# Patient Record
(demographics unavailable — no encounter records)

---

## 2024-12-10 NOTE — HISTORY OF PRESENT ILLNESS
[FreeTextEntry1] : Patient is a 70 yo female, never a smoker, with a PMHx of HTN, depression, multiple orthopedic problems. She has a hx of sleeve gastrectomy and hiatal hernia repair 6/8/21, lost approximately 70 lbs, started Ozempic for c/o recent weight gain.   Patient is referred by DR. LILY MORSE for surgical eval of a lung nodule noted on CT scan.  Patient was planning for a shoulder replacement surgery, preop shoulder CT showed the left upper lobe nodule.  Per patient, she was told to have pulmonary nodule in 2021, when she had CT Chest for dyspnea, lost follow up.  She is very anxious about the current finding. Denies unintentional weight loss, headache, anorexia, fatigue, persist cough, chest pain, dyspnea, or hemoptysis. She saw CTS in [facility] who was not aware about her previous scan, and suggested a 3-month follow up with CT chest,   CT shoulder w/o IVC 11/25/24: -Severe right glenohumeral osteoarthritis with mild attritional glenoid bone loss and acquired glenoid retroversion -Irregular mixed cystic and solid left upper pole pulmonary lesion. While this may be infectious in etiology, short interval follow up chest ct ( 8-10 weeks) and thoracic surgery  -Incidental outpatient: indeterminate mixed cystic and solid left upper lobe pulmonary lesion with recommendations of a follow up chest ct in 8-10 weeks and thoracic surgery consultation.

## 2024-12-10 NOTE — HISTORY OF PRESENT ILLNESS
[FreeTextEntry1] : Patient is a 70 yo female, never a smoker, with a PMHx of HTN, depression, multiple orthopedic problems. She has a hx of sleeve gastrectomy and hiatal hernia repair 6/8/21, lost approximately 70 lbs, started Ozempic for c/o recent weight gain.   Patient is referred by DR. LILY MORSE for surgical eval of a lung nodule noted on CT scan.  Patient was planning for a shoulder replacement surgery, preop shoulder CT showed the left upper lobe nodule.  Per patient, she was told to have pulmonary nodule in 2021, when she had CT Chest for dyspnea, lost follow up.  She is very anxious about the current finding. Denies unintentional weight loss, headache, anorexia, fatigue, persist cough, chest pain, dyspnea, or hemoptysis. She saw CTS in [facility] who was not aware about her previous scan, and suggested a 3-month follow up with CT chest,   CT shoulder w/o IVC 11/25/24: -Severe right glenohumeral osteoarthritis with mild attritional glenoid bone loss and acquired glenoid retroversion -Irregular mixed cystic and solid left upper pole pulmonary lesion. While this may be infectious in etiology, short interval follow up chest ct ( 8-10 weeks) and thoracic surgery  -Incidental outpatient: indeterminate mixed cystic and solid left upper lobe pulmonary lesion with recommendations of a follow up chest ct in 8-10 weeks and thoracic surgery consultation.

## 2024-12-10 NOTE — CONSULT LETTER
[Dear  ___] : Dear  [unfilled], [Consult Letter:] : I had the pleasure of evaluating your patient, [unfilled]. [Please see my note below.] : Please see my note below. [Sincerely,] : Sincerely, [FreeTextEntry2] : DR. LILY MORSE [FreeTextEntry3] : Sathya Macario MD   Attending Thoracic Surgeon  Department of Cardiovascular and Thoracic Surgery   of Cardiothoracic Surgery  [employer] at [facility], Division of Thoracic Surgery  [address] [address]  Tel: [phone]  Fax: [phone]

## 2024-12-10 NOTE — HISTORY OF PRESENT ILLNESS
[FreeTextEntry1] : Patient is a 70 yo female, never a smoker, with a PMHx of HTN, depression, multiple orthopedic problems. She has a hx of sleeve gastrectomy and hiatal hernia repair 6/8/21, lost approximately 70 lbs, started Ozempic for c/o recent weight gain.   Patient is referred by DR. LILY MORSE for surgical eval of a lung nodule noted on CT scan.  Patient was planning for a shoulder replacement surgery, preop shoulder CT showed the left upper lobe nodule.  Per patient, she was told to have pulmonary nodule in 2021, when she had CT Chest for dyspnea, lost follow up.  She is very anxious about the current finding. Denies unintentional weight loss, headache, anorexia, fatigue, persist cough, chest pain, dyspnea, or hemoptysis. She saw CTS in [facility] who was not aware about her previous scan, and suggested a 3-month follow up with CT chest,   CT shoulder w/o IVC 11/25/24: -Severe right glenohumeral osteoarthritis with mild attritional glenoid bone loss and acquired glenoid retroversion -Irregular mixed cystic and solid left upper pole pulmonary lesion. While this may be infectious in etiology, short interval follow up chest ct ( 8-10 weeks) and thoracic surgery  -Incidental outpatient: indeterminate mixed cystic and solid left upper lobe pulmonary lesion with recommendations of a follow up chest ct in 8-10 weeks and thoracic surgery consultation.

## 2024-12-10 NOTE — PHYSICAL EXAM
[Restricted in physically strenuous activity but ambulatory and able to carry out work of a light or sedentary nature] : Status 1- Restricted in physically strenuous activity but ambulatory and able to carry out work of a light or sedentary nature, e.g., light house work, office work [General Appearance - Alert] : alert [General Appearance - In No Acute Distress] : in no acute distress [Neck Appearance] : the appearance of the neck was normal [Neck Cervical Mass (___cm)] : no neck mass was observed [Jugular Venous Distention Increased] : there was no jugular-venous distention [Thyroid Diffuse Enlargement] : the thyroid was not enlarged [Thyroid Nodule] : there were no palpable thyroid nodules [] : no respiratory distress [Auscultation Breath Sounds / Voice Sounds] : lungs were clear to auscultation bilaterally [Heart Rate And Rhythm] : heart rate was normal and rhythm regular [Heart Sounds] : normal S1 and S2 [Heart Sounds Gallop] : no gallops [Murmurs] : no murmurs [Heart Sounds Pericardial Friction Rub] : no pericardial rub [Deep Tendon Reflexes (DTR)] : deep tendon reflexes were 2+ and symmetric [Sensation] : the sensory exam was normal to light touch and pinprick [No Focal Deficits] : no focal deficits [Oriented To Time, Place, And Person] : oriented to person, place, and time [Impaired Insight] : insight and judgment were intact [Affect] : the affect was normal

## 2024-12-10 NOTE — DATA REVIEWED
[FreeTextEntry1] : CT chest on 04/20/2021 - Lungs and large airways: Tubular nodule in the apical posterior segment left upper lobe, likely mucous plugging. Subcentimeter nodules along the left fissure, likely intrapulmonary lymph nodes. Mild cylindrical bronchiectasis. Patent central airways. - Pleura:  No pleural effusion. - Mediastinum and hilar regions: No thoracic lymphadenopathy. - Heart and pericardium:  Heart size is normal. No pericardial effusion. - Vessels:  Mild aortic root calcification. Borderline dilated ascending aorta, 3.9 cm. - Chest wall and lower neck:  Normal. - Upper abdomen: Hepatic steatosis. Surgical clips along the second portion of the duodenum. Right posterior diaphragmatic hernia containing fat. - Bones: Posterior fixation hardware in the lower thoracic spine and lumbar spine. Degenerative changes in the spine.

## 2024-12-10 NOTE — ASSESSMENT
[FreeTextEntry1] : Patient is a 71 year old never smoker, , who was referred by Dr. Madison Sanchez, for an enlarging left upper lobe cystic lung nodule.   Patient has a family history of lung cancer, is a never smoker and denies significant environmental exposures. She had a chest CT in April 2021 that showed a small cystic nodule in, less than 5 mm in size, that was concerning for mucus plugging. Patient obtained a left shoulder MRI on November 25, 2024 that showed this nodule is now an irregular cystic nodule measuring 2.5 cm in greatest dimension.   I discussed the differential diagnosis with her and despite her smoking status, I am still very concerned that this is a primary lung cancer, given that was present in 2021 and enlarged over time.  Discussed diagnosis and staging of lung cancer. Diagnosis can be obtained by CT guided biopsy or surgical biopsy. As it is cystic, a CT guided biopsy may be challenging to obtain a diagnosis. Discussed obtaining a PET/CT under the presumption that it is a lung cancer and PFTs. Will review the results with her to discuss surgical resection for diagnosis and treatment. Patient understood and agreed. Will follow up when results are available.   PLAN 1.  PET/CT 2.  PFTs 3.  Return to clinic  I, Dr. Macario, personally performed the evaluation and management (E/M) services for this new patient. That E/M includes conducting the clinically appropriate initial history &/or exam, assessing all conditions, and establishing the plan of care. Today, my PHILLIP, [Ivana Vences], was here to observe my evaluation and management service for this patient & follow plan of care established by me going forward.

## 2024-12-10 NOTE — CONSULT LETTER
[Dear  ___] : Dear  [unfilled], [Consult Letter:] : I had the pleasure of evaluating your patient, [unfilled]. [Please see my note below.] : Please see my note below. [Sincerely,] : Sincerely, [FreeTextEntry2] : DR. LILY MORSE [FreeTextEntry3] : Sathya Macario MD   Attending Thoracic Surgeon  Department of Cardiovascular and Thoracic Surgery   of Cardiothoracic Surgery  [employer] at [facility], Division of Thoracic Surgery  [address] [address]  Tel: [phone]  Fax: [phone]

## 2024-12-10 NOTE — CONSULT LETTER
[Dear  ___] : Dear  [unfilled], [Consult Letter:] : I had the pleasure of evaluating your patient, [unfilled]. [Please see my note below.] : Please see my note below. [Sincerely,] : Sincerely, [FreeTextEntry2] : DR. LILY MORSE [FreeTextEntry3] : Sathya Macario MD   Attending Thoracic Surgeon  Department of Cardiovascular and Thoracic Surgery   of Cardiothoracic Surgery  [employer] at [facility], Division of Thoracic Surgery  [address] [id number]  Tel: [phone]  Fax: [phone]

## 2024-12-11 NOTE — REASON FOR VISIT
[Home] : at home, [unfilled] , at the time of the visit. [Medical Office: ([facility])___] : at the medical office located in  [Patient] : the patient [Follow-Up: _____] : a [unfilled] follow-up visit

## 2024-12-11 NOTE — REASON FOR VISIT
[Home] : at home, [unfilled] , at the time of the visit. [Medical Office: ([facility])___] : at the medical office located in  [Patient] : the patient [Follow-Up: _____] : a [unfilled] follow-up visit

## 2024-12-11 NOTE — REASON FOR VISIT
[Home] : at home, [unfilled] , at the time of the visit. [Medical Office: ([facility])___] : at the medical office located in  [Patient] : the patient [Follow-Up: _____] : a [unfilled] follow-up visit

## 2024-12-11 NOTE — REASON FOR VISIT
[Home] : at home, [unfilled] , at the time of the visit. [Medical Office: ([facility])___] : at the medical office located in  [Patient] : the patient [Follow-Up: _____] : a [unfilled] follow-up visit

## 2024-12-15 NOTE — HISTORY OF PRESENT ILLNESS
[FreeTextEntry1] : Patient is a 70 yo female, never a smoker, with a PMHx of HTN, depression, multiple orthopedic problems. She has a hx of sleeve gastrectomy and hiatal hernia repair 6/8/21. Patient was referred by DR. LILY MORSE for an enlarging left upper lobe cystic lung nodule.  Patient has a family history of lung cancer, is a never smoker and denies significant environmental exposures. She had a chest CT in April 2021 that showed a small cystic nodule in, less than 5 mm in size, that was concerning for mucus plugging. Patient obtained a left shoulder MRI on November 25, 2024 that showed this nodule is now an irregular cystic nodule measuring 2.5 cm in greatest dimension.  A primary lung cancer was the major differential, given that was present in 2021 and enlarged over time. Discussed diagnosis and staging of lung cancer. Diagnosis can be obtained by CT guided biopsy or surgical biopsy. As it is cystic, a CT guided biopsy may be challenging to obtain a diagnosis  She presents today after completion of PET/CT and PFT.   PET on 12/06/2024  1. Left apical lung lesion with mild FDG uptake, increased in overall size since 2021, is strongly favored to be of benign etiology, possibly representing area of focal scarring with associated traction bronchiectasis. A cavitary malignant nodule is considered unlikely. Suggest continued periodic CT follow-up to assess for interval change as clinically warranted.  2. Small umbilical hernia with inflammatory change and associated mild uptake. Correlate with physical exam.  3. 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

## 2024-12-15 NOTE — HISTORY OF PRESENT ILLNESS
[FreeTextEntry1] : Patient is a 72 yo female, never a smoker, with a PMHx of HTN, depression, multiple orthopedic problems. She has a hx of sleeve gastrectomy and hiatal hernia repair 6/8/21. Patient was referred by DR. LILY MORSE for an enlarging left upper lobe cystic lung nodule.  Patient has a family history of lung cancer, is a never smoker and denies significant environmental exposures. She had a chest CT in April 2021 that showed a small cystic nodule in, less than 5 mm in size, that was concerning for mucus plugging. Patient obtained a left shoulder MRI on November 25, 2024 that showed this nodule is now an irregular cystic nodule measuring 2.5 cm in greatest dimension.  A primary lung cancer was the major differential, given that was present in 2021 and enlarged over time. Discussed diagnosis and staging of lung cancer. Diagnosis can be obtained by CT guided biopsy or surgical biopsy. As it is cystic, a CT guided biopsy may be challenging to obtain a diagnosis  She presents today after completion of PET/CT and PFT.   PET on 12/06/2024  1. Left apical lung lesion with mild FDG uptake, increased in overall size since 2021, is strongly favored to be of benign etiology, possibly representing area of focal scarring with associated traction bronchiectasis. A cavitary malignant nodule is considered unlikely. Suggest continued periodic CT follow-up to assess for interval change as clinically warranted.  2. Small umbilical hernia with inflammatory change and associated mild uptake. Correlate with physical exam.  3. 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

## 2024-12-15 NOTE — ASSESSMENT
[FreeTextEntry1] : Patient is a 71 year old female, never smoker, who was found to have an enlarging cystic nodule in her left upper lobe. She was referred by Dr. Sanchez. Her images from 2021 and 2024 were reviewed. She underwent PET/CT and returns today to discuss management options.  All options were discussed and the patient would like to proceed with surgery. I discussed the risks benefits and alternatives of proceeding with surgery. She understood and agreed. We will proceed with PFTs on 12/31, if not sooner. She will follow up in clinic on 1/2/25 to discuss any further details of surgery and we will go to the operating room the following week. We will proceed with a left VATS robotic assisted wedge resection, possible segmentectomy and possible lymph node dissection on the week of 1/6/25.  PLAN 1.  PFTs 12/31 2.  Preoperative evaluation with Dr. Sanchez 3.  Clinic follow up on 1/2/25 4.  Surgery week of 1/6/25    I, Dr. Macario, personally performed the evaluation and management (E/M) services for this established patient who presents today with (a) new problem(s)/exacerbation of (an) existing condition(s). That E/M includes conducting the clinically appropriate interval history &/or exam, assessing all new/exacerbated conditions, and establishing a new plan of care.
(4) no limitation

## 2024-12-15 NOTE — DATA REVIEWED
[FreeTextEntry1] : CT shoulder w/o IVC 11/25/24: -Severe right glenohumeral osteoarthritis with mild attritional glenoid bone loss and acquired glenoid retroversion -Irregular mixed cystic and solid left upper pole pulmonary lesion. While this may be infectious in etiology, short interval follow up chest ct ( 8-10 weeks) and thoracic surgery -Incidental outpatient: indeterminate mixed cystic and solid left upper lobe pulmonary lesion with recommendations of a follow up chest ct in 8-10 weeks and thoracic surgery consultation.  CT chest on 04/20/2021 - Lungs and large airways: Tubular nodule in the apical posterior segment left upper lobe, likely mucous plugging. Subcentimeter nodules along the left fissure, likely intrapulmonary lymph nodes. Mild cylindrical bronchiectasis. Patent central airways. - Pleura: No pleural effusion. - Mediastinum and hilar regions: No thoracic lymphadenopathy. - Heart and pericardium: Heart size is normal. No pericardial effusion. - Vessels: Mild aortic root calcification. Borderline dilated ascending aorta, 3.9 cm. - Chest wall and lower neck: Normal. - Upper abdomen: Hepatic steatosis. Surgical clips along the second portion of the duodenum. Right posterior diaphragmatic hernia containing fat. - Bones: Posterior fixation hardware in the lower thoracic spine and lumbar spine. Degenerative changes in the spine.

## 2024-12-23 NOTE — PHYSICAL EXAM
[Obese] : obese [Normal] : affect appropriate [Well Developed] : well developed [Well Nourished] : well nourished [No Acute Distress] : no acute distress [Normal Conjunctiva] : normal conjunctiva [Normal Venous Pressure] : normal venous pressure [No Carotid Bruit] : no carotid bruit [Normal S1, S2] : normal S1, S2 [No Murmur] : no murmur [No Rub] : no rub [No Gallop] : no gallop [Clear Lung Fields] : clear lung fields [Good Air Entry] : good air entry [No Respiratory Distress] : no respiratory distress  [Soft] : abdomen soft [Non Tender] : non-tender [No Masses/organomegaly] : no masses/organomegaly [Normal Bowel Sounds] : normal bowel sounds [Normal Gait] : normal gait [No Edema] : no edema [No Cyanosis] : no cyanosis [No Clubbing] : no clubbing [No Varicosities] : no varicosities [No Rash] : no rash [No Skin Lesions] : no skin lesions [Moves all extremities] : moves all extremities [No Focal Deficits] : no focal deficits [Normal Speech] : normal speech [Alert and Oriented] : alert and oriented [Normal memory] : normal memory

## 2024-12-23 NOTE — REASON FOR VISIT
[Hyperlipidemia] : hyperlipidemia [Hypertension] : hypertension [Follow-Up Visit] : a follow-up visit for [Morbid Obesity (BMI<40)] : morbid obesity (bmi<40) [S/P Bariatric Surgery] : s/p bariatric surgery

## 2024-12-24 NOTE — END OF VISIT
[Time Spent: ___ minutes] : I have spent [unfilled] minutes of time on the encounter which excludes teaching and separately reported services. [FreeTextEntry3] : Documented by Anil Hunter acting as a scribe for STACEY Pineda on 12/06/2023

## 2024-12-24 NOTE — HISTORY OF PRESENT ILLNESS
[de-identified] : Ms. Suzanne Obregon returns today s/p sleeve gastrectomy and hiatal hernia repair 6/8/21 for follow up. Today, she returns complaining of weight regain and is interested in medical weight loss. She is scheduled for stress test tomorrow. Importance of maintaining a healthy lifestyle with a healthy diet and exercise emphasized. Has gained 12 lbs. since the last visit. Discussed weight loss with GLP analog in great detail. Will have her start on Ozempic and have her follow up prn. [FreeTextEntry1] :  Patient is a 71-year-old female never smoker who was found to have a 1.7 to 2 cm lesion when getting a x-ray for her shoulder on examination of CT scan from April 2021 there was a 4 to 6 mm lesion a PET scan was performed that showed mild enhancement after discussion with Dr. Macario patient will go for an open biopsy and if malignancy is found we will have a wedge resection and lymph node node dissection.  Patient underwent a gastric sleeve and hiatal hernia repair Denisha 3, 2021  Pt lost 65 lbs Patient has had multiple orthopedic surgeries including LS-spine surgery patient plans to have a reverse replacement of the left shoulder and during this workup a lung nodule was found Patient has a long history of hypertension Patient is cleared for surgery with blood pressure,ekg and o2 sat monitoring

## 2024-12-24 NOTE — REVIEW OF SYSTEMS
[Weight Loss (___ Lbs)] : [unfilled] ~Ulb weight loss [Dyspnea on exertion] : dyspnea during exertion [Joint Pain] : joint pain [Numbness (Hypoesthesia)] : numbness [Tingling (Paresthesia)] : tingling [Weakness] : weakness [Limb Weakness (Paresis)] : limb weakness (Paresis) [Negative] : Heme/Lymph [Depression] : no depression [FreeTextEntry2] : lost 44 lbs

## 2024-12-24 NOTE — HISTORY OF PRESENT ILLNESS
[de-identified] : Ms. Suzanne Obregon returns today s/p sleeve gastrectomy and hiatal hernia repair 6/8/21 for follow up. Today, she returns complaining of weight regain and is interested in medical weight loss. She is scheduled for stress test tomorrow. Importance of maintaining a healthy lifestyle with a healthy diet and exercise emphasized. Has gained 12 lbs. since the last visit. Discussed weight loss with GLP analog in great detail. Will have her start on Ozempic and have her follow up prn. [FreeTextEntry1] :  Patient is a 71-year-old female never smoker who was found to have a 1.7 to 2 cm lesion when getting a x-ray for her shoulder on examination of CT scan from April 2021 there was a 4 to 6 mm lesion a PET scan was performed that showed mild enhancement after discussion with Dr. Macario patient will go for an open biopsy and if malignancy is found we will have a wedge resection and lymph node node dissection.  Patient underwent a gastric sleeve and hiatal hernia repair Denisha 3, 2021  Pt lost 65 lbs Patient has had multiple orthopedic surgeries including LS-spine surgery patient plans to have a reverse replacement of the left shoulder and during this workup a lung nodule was found Patient has a long history of hypertension Patient is cleared for surgery with blood pressure,ekg and o2 sat monitoring

## 2024-12-27 NOTE — REASON FOR VISIT
[Home] : at home, [unfilled] , at the time of the visit. [Medical Office: ([facility])___] : at the medical office located in  [Follow-Up: _____] : a [unfilled] follow-up visit

## 2024-12-27 NOTE — REASON FOR VISIT
[Home] : at home, [unfilled] , at the time of the visit. [Medical Office: ([facility])___] : at the medical office located in  [Follow-Up: _____] : a [unfilled] follow-up visit

## 2025-01-03 NOTE — HISTORY OF PRESENT ILLNESS
[FreeTextEntry1] : Patient is a 70 yo female, never a smoker, with a PMHx of HTN, depression, multiple orthopedic problems. She has a hx of sleeve gastrectomy and hiatal hernia repair 6/8/21. Patient was referred by DR. LILY MORSE for an enlarging left upper lobe cystic lung nodule. A primary lung cancer is the major differential diagnosis. She is scheduled for left VATS robotic assisted wedge resection, possible segmentectomy and possible lymph node dissection on 1/7/25. She completed PFTs on 12/31 and presents today for a follow up visit to further discuss details of the surgery.  PFT done on 12/31/24 showed FEV1 =1.76 , 93% of predicted value, FVC =2.39 , 99% of predicted value, PEF =6.19 , 124% of predicted value and DLCO =17.56 , 104% of predicted value.   Pre op with Dr. Morse 12/23: Patient is cleared for surgery

## 2025-01-03 NOTE — DATA REVIEWED
[FreeTextEntry1] : PET on 12/06/2024 1. Left apical lung lesion with mild FDG uptake, increased in overall size since 2021, is strongly favored to be of benign etiology, possibly representing area of focal scarring with associated traction bronchiectasis. A cavitary malignant nodule is considered unlikely. Suggest continued periodic CT follow-up to assess for interval change as clinically warranted. 2. Small umbilical hernia with inflammatory change and associated mild uptake. Correlate with physical exam. 3. 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

## 2025-01-03 NOTE — ASSESSMENT
[FreeTextEntry1] : Patient seen over telehealth to discuss PFTs and stress test results. Patient is scheduled for surgery on Tuesday, January 7. Her PFTs are excellent and could tolerate a lobectomy if needed. There are no concerns from a cardiac standpoint. Discussed risks of the operation, expected hospital stay and post operative recovery. Many questions answered. She will present on Tuesday for a left VATS robotic assisted wedge resection and lymph node dissection. She understood and agreed.  PLAN 1.  Bronchoscopy, left VATS robotic assisted upper lobe wedge resection and lymph node dissection on January 7, 2025.    I, Dr. Macario, personally performed the evaluation and management (E/M) services for this established patient who presents today with (a) new problem(s)/exacerbation of (an) existing condition(s). That E/M includes conducting the clinically appropriate interval history &/or exam, assessing all new/exacerbated conditions, and establishing a new plan of care.

## 2025-01-13 NOTE — ASSESSMENT
[FreeTextEntry1] : 70 yo F, never a smoker, with a PMHx of HTN, depression, multiple orthopedic problems, w/ PSHx of sleeve gastrectomy and hiatal hernia repair 6/8/21. Patient was referred by Dr. Madison Sanchez for an enlarging left upper lobe cystic lung nodule. On 1/7/25 she underwent an uncomplicated L VATS, RA YESENIA wedge resection, MLND. Post op CXR showed a small residual L apical PTX.  She was also noted to have been retaining 1L of urine, crystal placed. Patient was discharged with Crystal with plans for outpatient for repeat TOV.  Slightly hypotensive inpatient (low 100s), instructed to resume losartan and HCTZ at home. She presents today for her first post op visit with repeat CXR.

## 2025-01-13 NOTE — REASON FOR VISIT
[de-identified] : L VATS, RA YESENIA wedge resection, MLND  [de-identified] : 1/7/25 [de-identified] : 2 [de-identified] : .   Pathology: XXX

## 2025-01-13 NOTE — HISTORY OF PRESENT ILLNESS
[FreeTextEntry1] : Language: English Accompanied by: Self Contact info: [phone] Referring Provider/PCP: [facility] F/u   Initial H&P 01/13/2025: Ms. SUTTON is a very pleasant 71F who presents today for void trial.   Approximately 1 week ago, she underwent a left VATS with left upper lobe wedge resection and mediastinal lymph node dissection.  Postoperative course was complicated by acute urinary retention requiring Crystal catheter placement.  She had 1 L of urine in her bladder at the time of catheter placement.  Patient was discharged on postoperative day 2.  No issues since discharge.  She has now had regular BMs yet since surgery, however she has been hesitant to strain with the catheter in place. Eager for removal today.   Prior to surgery, noticed occasional LUTS - mild pressure in the bladder approx 10 mins after voids.  --------------------------------------------------------------------------------------------------------------------------------------- PHMx: HTN, Depression, Diabetes, HLD, obesity, CHARLI PSHx: Sleeve Gastrectomy, Hiatal Hernia, YESENIA Wedge Resection, Appendectomy, adenoids, hysterectomy, lumbar laminectomy, meniscus repair, rotator cuff repair SHx: Denies x3   All: Bees  14pt ROS neg other than HPI. --------------------------------------------------------------------------------------------------------------------------------------- Physical Exam: General: NAD, laying on exam table comfortably HEENT: NCAT, EOMI Resp: breathing comfortably on RA, b/l symm chest rise MSK: ambulates with cane Psych: appropriate affect : drape remained over patient's genitalia during physical exam and void trial. 14Fr catheter in place draining cyu.  --------------------------------------------------------------------------------------------------------------------------------------- Results:   --------------------------------------------------------------------------------------------------------------------------------------- Procedures: Void Trial 01/13/2025: Approximately 300cc sterile fluid was instilled into the bladder through the catheter via gravity drainage from a Cath tip syringe.  Pt did not experience any spasms or pain during filling.  Once instillation was complete, the crystal balloon was deflated and the catheter was removed without issue.  She was unable to void. --------------------------------------------------------------------------------------------------------------------------------------- A/P: 71F who presents today for void trial after experiencing postoperative retention in the hospital 1 week ago.  She still had significant constipation today prior to attempted void trial.  She was unable to void after her bladder was instilled with 300 cc of fluid.  Initially, plan was for her to increase her fluid intake until she had a significant urge to void and to obtain a PVR thereafter.  Patient preferred to go home, as she will be more comfortable at home and will be able to take stool softeners and diuretic.  I explained that she is still at risk for going into urinary retention, and if she is unable to void after the office closes, she will need to go to the emergency room to have the catheter replaced.  She was comfortable with this plan.  I also provided her with my personal cell phone number, and once she is able to void, or she is unable to void over the next 3 to 4 hours, she will call me immediately, so that we may discuss the plan moving forward.  She also mentioned today that prior to her surgery, she would at times have intermittent increased pressure approximately 10 minutes after having a regular void.  We discussed that there are number of reasons why one may have voiding issues later on in life, including possible prolapse (prior hysterectomy) versus age-related changes to the bladder following menopause.  Once she has fully recovered from surgery, I recommended that she follow-up with Dr. Lantigua to discuss in further detail.  I have answered all of his questions today, and I will see her for f/u as needed.   Plan: - pt to void at home and notify me of results - if unable --> ER for catheter replacement - f/u Dr. Lantigua (contact info given) - RTC PRN  In addition to today's procedure, I spent an additional 31 minutes on face-to-face counseling, coordination of care and clinical documentation.

## 2025-01-23 NOTE — ASSESSMENT
[FreeTextEntry1] : Patient is a 71 year old never smoker who was referred by Dr. Madison Sanchez for an enlarging left upper lobe cystic lung nodule. On 1/7/25 she underwent an uncomplicated L VATS, robotic assisted upper lobe wedge resection, MLND. Hospital course was uncomplicated aside from urinary retention. She returns for her first post operative visit.   Overall she is doing well and recovering well. She has some burning pain at her most anterior incision. Otherwise she is breathing well and active. Her x-ray was reviewed revealing typical post operative changes. Her pathology was reviewed and it was all benign. Discussed starting gabapentin for her pain. She is not taking other medication and reports she does not need it. We will see her back over telehealth in 3 weeks.   PLAN 1.  Gabapentin  mg sent to her pharmacy 2.  Follow up in 3 weeks by telehealth 3.  She is fit to return to PT and we will provide documentation    I, Dr. Macario, personally performed the evaluation and management (E/M) services for this established patient who presents today with (a) new problem(s)/exacerbation of (an) existing condition(s). That E/M includes conducting the clinically appropriate interval history &/or exam, assessing all new/exacerbated conditions, and establishing a new plan of care.

## 2025-01-23 NOTE — REASON FOR VISIT
[de-identified] : L VATS, RA YESENIA wedge resection, MLND  [de-identified] : 1/7/25 [de-identified] : 2 [de-identified] : .   Pathology:  - Focal area of fibrosis, measuring 1 cm in greatest dimension with surrounding non necrotizing granulomatous inflammation associated with reactive epithelial changes - No evidence of a neoplastic process on the examined material - AFB, GMS and PAS special stains did not highlight any microorganisms - Clinicopathologic correlation is recommend

## 2025-01-23 NOTE — REASON FOR VISIT
[de-identified] : L VATS, RA YESENIA wedge resection, MLND  [de-identified] : 1/7/25 [de-identified] : 2 [de-identified] : .   Pathology:  - Focal area of fibrosis, measuring 1 cm in greatest dimension with surrounding non necrotizing granulomatous inflammation associated with reactive epithelial changes - No evidence of a neoplastic process on the examined material - AFB, GMS and PAS special stains did not highlight any microorganisms - Clinicopathologic correlation is recommend

## 2025-02-14 NOTE — ASSESSMENT
[FreeTextEntry1] : Patient is a 71 year old never smoker who was referred by Dr. Madsion Sanchez for an enlarging left upper lobe cystic lung nodule. On 1/7/25 she underwent an uncomplicated left VATS, robotic assisted upper lobe wedge resection, MLND. Pathology was benign. Overall, she has been recovering well post op. She had burning pain at her most anterior incision that she was given a prescription for Gabapentin. She presents today for a 3 week follow up telehealth visit to ensure she continues to recover well.  Overall she is doing very well. She has no pain issues, is able to wear her bra, which was an issues previously because of the incisions, and is not having any respiratory issues. She is starting PT again and will be having shoulder surgery at the end of April. There are no concerns from my standpoint in terms of proceeding with surgery or continuing with PT.  We will send all records to her surgeon as needed. She is also requesting consultation with a spine surgery regarding her spinal stenosis and previous operation. I will discuss with Dr. Chowdhury.   She needs no further chest CTs for surveillance. Will see her back in 3 months over telehealth and determine remaining follow up from there.   PLAN 1.  3 month follow up 2.  Referral to Dr. Chowdhury 3.  Send records to orthopedic surgeon    I, Dr. Macario, personally performed the evaluation and management (E/M) services for this established patient who presents today with (a) new problem(s)/exacerbation of (an) existing condition(s). That E/M includes conducting the clinically appropriate interval history &/or exam, assessing all new/exacerbated conditions, and establishing a new plan of care.

## 2025-02-14 NOTE — REASON FOR VISIT
[Telehealth (audio & video)] : This visit was provided via telehealth using real-time 2-way audio visual technology. [de-identified] : L VATS, RA YESENIA wedge resection, MLND  [de-identified] : 1/7/25 [de-identified] : 5 [de-identified] :  Pathology: - Focal area of fibrosis, measuring 1 cm in greatest dimension with surrounding non necrotizing granulomatous inflammation associated with reactive epithelial changes - No evidence of a neoplastic process on the examined material - AFB, GMS and PAS special stains did not highlight any microorganisms - Clinicopathologic correlation is recommend.

## 2025-02-14 NOTE — ASSESSMENT
Relayed MD message to patient, who verbalized understanding. [FreeTextEntry1] : Patient is a 71 year old never smoker who was referred by Dr. Madison Sanchez for an enlarging left upper lobe cystic lung nodule. On 1/7/25 she underwent an uncomplicated left VATS, robotic assisted upper lobe wedge resection, MLND. Pathology was benign. Overall, she has been recovering well post op. She had burning pain at her most anterior incision that she was given a prescription for Gabapentin. She presents today for a 3 week follow up telehealth visit to ensure she continues to recover well.  Overall she is doing very well. She has no pain issues, is able to wear her bra, which was an issues previously because of the incisions, and is not having any respiratory issues. She is starting PT again and will be having shoulder surgery at the end of April. There are no concerns from my standpoint in terms of proceeding with surgery or continuing with PT.  We will send all records to her surgeon as needed. She is also requesting consultation with a spine surgery regarding her spinal stenosis and previous operation. I will discuss with Dr. Chowdhury.   She needs no further chest CTs for surveillance. Will see her back in 3 months over telehealth and determine remaining follow up from there.   PLAN 1.  3 month follow up 2.  Referral to Dr. Chowdhury 3.  Send records to orthopedic surgeon    I, Dr. Macario, personally performed the evaluation and management (E/M) services for this established patient who presents today with (a) new problem(s)/exacerbation of (an) existing condition(s). That E/M includes conducting the clinically appropriate interval history &/or exam, assessing all new/exacerbated conditions, and establishing a new plan of care.

## 2025-02-14 NOTE — REASON FOR VISIT
[Telehealth (audio & video)] : This visit was provided via telehealth using real-time 2-way audio visual technology. [de-identified] : L VATS, RA YESENIA wedge resection, MLND  [de-identified] : 1/7/25 [de-identified] : 5 [de-identified] :  Pathology: - Focal area of fibrosis, measuring 1 cm in greatest dimension with surrounding non necrotizing granulomatous inflammation associated with reactive epithelial changes - No evidence of a neoplastic process on the examined material - AFB, GMS and PAS special stains did not highlight any microorganisms - Clinicopathologic correlation is recommend.

## 2025-05-12 NOTE — REASON FOR VISIT
[Telehealth (audio & video)] : This visit was provided via telehealth using real-time 2-way audio visual technology. [Follow-Up: _____] : a [unfilled] follow-up visit

## 2025-06-13 NOTE — HISTORY OF PRESENT ILLNESS
[FreeTextEntry1] : Patient is a 71 year old never smoker who was referred by Dr. Madison Sanchez for an enlarging left upper lobe cystic lung nodule. On 1/7/25 she underwent an uncomplicated left VATS, robotic assisted upper lobe wedge resection, MLND. Pathology was benign. No need for surveillance. Overall, she has been recovered well post op. At her prior visit, her pain was well controlled, and she was starting PT with plans for shoulder surgery at the end of April. She also requested consultation with a spine surgery regarding her spinal stenosis and previous operation, referred to Dr. Chowdhury. No appointment on record.   She presents for a 3 month follow up to determine plan for follow up.

## 2025-06-13 NOTE — ASSESSMENT
[FreeTextEntry1] : Patient is recovering well from her procedure. Her pain is well controlled. Shoulder surgery was delayed, and she has not been able to start PT as of yet. Requesting an updated order, we will provide one for her as her GP is not available. She is otherwise recovering well from a surgical prospective and can follow up with us as needed.  All questions and concerns were addressed with the patient at the time of visit, who expressed understanding.   Plan: -Ok to resume PT -Follow up with CT surg PRN